# Patient Record
Sex: MALE | Race: BLACK OR AFRICAN AMERICAN | Employment: FULL TIME | URBAN - METROPOLITAN AREA
[De-identification: names, ages, dates, MRNs, and addresses within clinical notes are randomized per-mention and may not be internally consistent; named-entity substitution may affect disease eponyms.]

---

## 2019-04-17 ENCOUNTER — APPOINTMENT (OUTPATIENT)
Dept: GENERAL RADIOLOGY | Age: 24
End: 2019-04-17
Attending: EMERGENCY MEDICINE
Payer: SELF-PAY

## 2019-04-17 ENCOUNTER — HOSPITAL ENCOUNTER (EMERGENCY)
Age: 24
Discharge: HOME | End: 2019-04-17
Attending: EMERGENCY MEDICINE | Admitting: EMERGENCY MEDICINE
Payer: SELF-PAY

## 2019-04-17 VITALS
HEART RATE: 62 BPM | TEMPERATURE: 99.1 F | DIASTOLIC BLOOD PRESSURE: 95 MMHG | HEIGHT: 69 IN | BODY MASS INDEX: 25.8 KG/M2 | WEIGHT: 174.16 LBS | RESPIRATION RATE: 16 BRPM | SYSTOLIC BLOOD PRESSURE: 139 MMHG

## 2019-04-17 DIAGNOSIS — M79.642 LEFT HAND PAIN: Primary | ICD-10-CM

## 2019-04-17 DIAGNOSIS — T14.90XA BLUNT INJURY: ICD-10-CM

## 2019-04-17 PROCEDURE — 73130 X-RAY EXAM OF HAND: CPT

## 2019-04-17 PROCEDURE — 74011250637 HC RX REV CODE- 250/637: Performed by: EMERGENCY MEDICINE

## 2019-04-17 PROCEDURE — 99282 EMERGENCY DEPT VISIT SF MDM: CPT

## 2019-04-17 RX ORDER — IBUPROFEN 400 MG/1
800 TABLET ORAL
Status: COMPLETED | OUTPATIENT
Start: 2019-04-17 | End: 2019-04-17

## 2019-04-17 RX ORDER — IBUPROFEN 800 MG/1
800 TABLET ORAL
Qty: 20 TAB | Refills: 0 | Status: SHIPPED | OUTPATIENT
Start: 2019-04-17 | End: 2019-04-24

## 2019-04-17 RX ORDER — ACETAMINOPHEN 500 MG
1000 TABLET ORAL ONCE
Status: COMPLETED | OUTPATIENT
Start: 2019-04-17 | End: 2019-04-17

## 2019-04-17 RX ADMIN — ACETAMINOPHEN 1000 MG: 500 TABLET ORAL at 15:57

## 2019-04-17 RX ADMIN — IBUPROFEN 800 MG: 400 TABLET, FILM COATED ORAL at 15:56

## 2019-04-17 NOTE — LETTER
[facility] EMERGENCY DEPT 
[address] [place] [address] [address] [phone] 
[phone] Work/School Note Date: 4/17/2019 To Whom It May concern: 
 
Juhi David was seen and treated today in the emergency room by the following provider(s): 
Attending Provider: Yuly Morelos DO Physician Assistant: WALLY Reynolds. Juhi David may return to work on [address] [place] [address]  Sincerely, WALLY Tinoco

## 2019-04-18 NOTE — ED PROVIDER NOTES
EMERGENCY DEPARTMENT HISTORY AND PHYSICAL EXAM 
 
 
Date: 4/17/2019 Patient Name: Gutierrez Dudley History of Presenting Illness Chief Complaint Patient presents with  
 Hand Pain L hand pain and swelling, reports that he was at work when a pole jammed into his L hand History Provided By: Patient HPI: Gutierrez Dudley, 21 y.o. male presents ambulatory to the ED with cc of several hours of 8 out of 10 constant, aching left hand pain that is worse with palpation and movement and started suddenly at work when a heavy steel pole jammed into his left hand. He is left-hand dominant. There are no other complaints, changes, or physical findings at this time. PCP: Zhanna Blackman MD 
 
Current Outpatient Medications Medication Sig Dispense Refill  ibuprofen (MOTRIN) 800 mg tablet Take 1 Tab by mouth every eight (8) hours as needed for Pain for up to 7 days. 20 Tab 0  
 Epinephrine 0.15 mg/0.15 mL PnIj by IntraMUSCular route.  phenylephrine-chlorpheniramine-hydrocodone (HISTINEX HC) 2-5-2.5 mg/5 mL Syrp Take 10 mL by mouth four (4) times daily as needed for Cough. 120 mL 1 Past History Past Medical History: No past medical history on file. Past Surgical History: No past surgical history on file. Family History: No family history on file. Social History: 
Social History Tobacco Use  Smoking status: Never Smoker  Smokeless tobacco: Never Used Substance Use Topics  Alcohol use: Not on file  Drug use: Not on file Allergies: Allergies Allergen Reactions  Pollen Extracts Itching Review of Systems Review of Systems Constitutional: Negative for fatigue and fever. HENT: Negative for congestion, ear pain and rhinorrhea. Eyes: Negative for pain and redness. Respiratory: Negative for cough and wheezing. Cardiovascular: Negative for chest pain and palpitations. Gastrointestinal: Negative for abdominal pain, nausea and vomiting. Genitourinary: Negative for dysuria, frequency and urgency. Musculoskeletal: Negative for back pain, neck pain and neck stiffness. Left hand pain Skin: Negative for rash and wound. Neurological: Negative for weakness, light-headedness, numbness and headaches. Physical Exam  
Physical Exam  
Constitutional: He is oriented to person, place, and time. He appears well-developed and well-nourished. Non-toxic appearance. No distress. HENT:  
Head: Normocephalic and atraumatic. Head is without right periorbital erythema and without left periorbital erythema. Right Ear: External ear normal.  
Left Ear: External ear normal.  
Nose: Nose normal.  
Mouth/Throat: Uvula is midline. No trismus in the jaw. Eyes: Pupils are equal, round, and reactive to light. Conjunctivae and EOM are normal. No scleral icterus. Neck: Normal range of motion and full passive range of motion without pain. Cardiovascular: Normal rate, regular rhythm and normal heart sounds. Pulmonary/Chest: Effort normal and breath sounds normal. No accessory muscle usage. No tachypnea. No respiratory distress. He has no decreased breath sounds. He has no wheezes. Abdominal: Soft. There is no tenderness. There is no rigidity and no guarding. Musculoskeletal: Normal range of motion. Left hand: He exhibits tenderness. He exhibits normal range of motion, no bony tenderness, no deformity and no laceration. Normal sensation noted. Hands: 
Neurological: He is alert and oriented to person, place, and time. He is not disoriented. No cranial nerve deficit or sensory deficit. GCS eye subscore is 4. GCS verbal subscore is 5. GCS motor subscore is 6. Skin: Skin is intact. No rash noted. Psychiatric: He has a normal mood and affect. His speech is normal.  
Nursing note and vitals reviewed. Diagnostic Study Results Labs - 
 No results found for this or any previous visit (from the past 12 hour(s)). Radiologic Studies -  
XR HAND LT MIN 3 V Final Result IMPRESSION: No acute abnormality. CT Results  (Last 48 hours) None CXR Results  (Last 48 hours) None Medical Decision Making I am the first provider for this patient. I reviewed the vital signs, available nursing notes, past medical history, past surgical history, family history and social history. Vital Signs-Reviewed the patient's vital signs. Patient Vitals for the past 12 hrs: 
 Temp Pulse Resp BP  
04/17/19 1548 99.1 °F (37.3 °C) 62 16 (!) 139/95 Records Reviewed: Nursing Notes and Old Medical Records Provider Notes (Medical Decision Making): DDx: Fracture, sprain, strain, contusion ED Course:  
Initial assessment performed. The patients presenting problems have been discussed, and they are in agreement with the care plan formulated and outlined with them. I have encouraged them to ask questions as they arise throughout their visit. Disposition: 
Discharge PLAN: 
1. Discharge Medication List as of 4/17/2019  6:19 PM  
  
START taking these medications Details  
ibuprofen (MOTRIN) 800 mg tablet Take 1 Tab by mouth every eight (8) hours as needed for Pain for up to 7 days. , Print, Disp-20 Tab, R-0  
  
  
CONTINUE these medications which have NOT CHANGED Details Epinephrine 0.15 mg/0.15 mL PnIj by IntraMUSCular route. Historical Med  
  
phenylephrine-chlorpheniramine-hydrocodone (HISTINEX HC) 2-5-2.5 mg/5 mL Syrp Take 10 mL by mouth four (4) times daily as needed for Cough. Print, 10 mL, Disp-120 mL, R-1  
  
  
 
2. Follow-up Information Follow up With Specialties Details Why Contact Info Shoaib Singletary MD Hand Surgery Schedule an appointment as soon as possible for a visit ORTHO / HAND: as needed if symptoms persist [address] 
[phone] Return to ED if worse Diagnosis Clinical Impression: 1. Left hand pain 2. Blunt injury

## 2019-07-15 ENCOUNTER — TELEPHONE (OUTPATIENT)
Dept: FAMILY MEDICINE CLINIC | Age: 24
End: 2019-07-15

## 2019-07-15 ENCOUNTER — OFFICE VISIT (OUTPATIENT)
Dept: URGENT CARE | Age: 24
End: 2019-07-15

## 2019-07-15 VITALS
TEMPERATURE: 98.7 F | RESPIRATION RATE: 18 BRPM | BODY MASS INDEX: 24.59 KG/M2 | HEIGHT: 69 IN | WEIGHT: 166 LBS | DIASTOLIC BLOOD PRESSURE: 62 MMHG | OXYGEN SATURATION: 98 % | HEART RATE: 56 BPM | SYSTOLIC BLOOD PRESSURE: 127 MMHG

## 2019-07-15 DIAGNOSIS — J98.01 COUGH DUE TO BRONCHOSPASM: Primary | ICD-10-CM

## 2019-07-15 RX ORDER — ALBUTEROL SULFATE 90 UG/1
2 AEROSOL, METERED RESPIRATORY (INHALATION)
Qty: 1 INHALER | Refills: 0 | Status: SHIPPED | OUTPATIENT
Start: 2019-07-15

## 2019-07-15 RX ORDER — BENZONATATE 200 MG/1
200 CAPSULE ORAL
Qty: 21 CAP | Refills: 0 | Status: SHIPPED | OUTPATIENT
Start: 2019-07-15 | End: 2019-07-22

## 2019-07-15 RX ORDER — PREDNISONE 10 MG/1
TABLET ORAL
Qty: 21 TAB | Refills: 0 | Status: SHIPPED | OUTPATIENT
Start: 2019-07-15

## 2019-07-15 NOTE — TELEPHONE ENCOUNTER
Mother calling to get appt for patient. He is severely congested, coughing up brown mucus. He has not been here since 2011, made her aware he would be considered a new patient because it has been over 3 years.  Mrs Ayah Costello can be reached at [phone]

## 2019-07-15 NOTE — PATIENT INSTRUCTIONS
Reactive Airway Disease: Care Instructions  Your Care Instructions    Reactive airway disease is a breathing problem that appears as wheezing, a whistling noise in your airways. It may be caused by a viral or bacterial infection, allergies, tobacco smoke, or something else in the environment. When you are around these triggers, your body releases chemicals that make the airways get tight. Reactive airway disease is a lot like asthma. Both can cause wheezing. But asthma is ongoing, while reactive airway disease may occur only now and then. Tests can be done to tell whether you have asthma. You may take the same medicines used to treat asthma. Good home care and follow-up care with your doctor can help you recover. Follow-up care is a key part of your treatment and safety. Be sure to make and go to all appointments, and call your doctor if you are having problems. It's also a good idea to know your test results and keep a list of the medicines you take. How can you care for yourself at home? · Take your medicines exactly as prescribed. Call your doctor if you think you are having a problem with your medicine. · Do not smoke or allow others to smoke around you. If you need help quitting, talk to your doctor about stop-smoking programs and medicines. These can increase your chances of quitting for good. · If you know what caused your wheezing (such as perfume or the odor of household chemicals), try to avoid it in the future. · Wash your hands several times a day, and consider using hand gels or wipes that contain alcohol. This can prevent colds and other infections. When should you call for help? Call 911 anytime you think you may need emergency care. For example, call if:    · You have severe trouble breathing.    Watch closely for changes in your health, and be sure to contact your doctor if:    · You cough up yellow, dark brown, or bloody mucus.     · You have a fever.     · Your wheezing gets worse. Where can you learn more? Go to http://rex-linda.info/. Enter S757 in the search box to learn more about \"Reactive Airway Disease: Care Instructions. \"  Current as of: September 5, 2018  Content Version: 11.9  © 7817-2539 [employer]. Care instructions adapted under license by [employer] (which disclaims liability or warranty for this information). If you have questions about a medical condition or this instruction, always ask your healthcare professional. [place] [address] any warranty or liability for your use of this information.

## 2019-07-15 NOTE — PROGRESS NOTES
Cough   The history is provided by the patient. This is a new problem. The current episode started more than 2 days ago. The problem occurs every few minutes. The problem has not changed since onset. The cough is non-productive. There has been no fever. Associated symptoms include rhinorrhea, sore throat, shortness of breath and wheezing. Pertinent negatives include no chest pain and no chills. He has tried nothing for the symptoms. Risk factors: exposed to MOLD. He is not a smoker. His past medical history is significant for asthma (in childhood- h/o RAD). History reviewed. No pertinent past medical history. History reviewed. No pertinent surgical history. History reviewed. No pertinent family history.      Social History     Socioeconomic History    Marital status: SINGLE     Spouse name: Not on file    Number of children: Not on file    Years of education: Not on file    Highest education level: Not on file   Occupational History    Not on file   Social Needs    Financial resource strain: Not on file    Food insecurity:     Worry: Not on file     Inability: Not on file    Transportation needs:     Medical: Not on file     Non-medical: Not on file   Tobacco Use    Smoking status: Never Smoker    Smokeless tobacco: Never Used   Substance and Sexual Activity    Alcohol use: Not on file    Drug use: Not on file    Sexual activity: Not on file   Lifestyle    Physical activity:     Days per week: Not on file     Minutes per session: Not on file    Stress: Not on file   Relationships    Social connections:     Talks on phone: Not on file     Gets together: Not on file     Attends Confucianism service: Not on file     Active member of club or organization: Not on file     Attends meetings of clubs or organizations: Not on file     Relationship status: Not on file    Intimate partner violence:     Fear of current or ex partner: Not on file     Emotionally abused: Not on file     Physically abused: Not on file     Forced sexual activity: Not on file   Other Topics Concern    Not on file   Social History Narrative    Not on file                ALLERGIES: Pollen extracts    Review of Systems   Constitutional: Negative for chills. HENT: Positive for rhinorrhea and sore throat. Respiratory: Positive for cough, shortness of breath and wheezing. Cardiovascular: Negative for chest pain. All other systems reviewed and are negative. Vitals:    07/15/19 1847   BP: 127/62   Pulse: (!) 56   Resp: 18   Temp: 98.7 °F (37.1 °C)   SpO2: 98%   Weight: 166 lb (75.3 kg)   Height: 5' 9\" (1.753 m)       Physical Exam   Constitutional: No distress. HENT:   Right Ear: Tympanic membrane and ear canal normal.   Left Ear: Tympanic membrane and ear canal normal.   Nose: Nose normal.   Mouth/Throat: No oropharyngeal exudate, posterior oropharyngeal edema or posterior oropharyngeal erythema. Eyes: Conjunctivae are normal. Right eye exhibits no discharge. Left eye exhibits no discharge. Neck: Neck supple. Pulmonary/Chest: Effort normal. No respiratory distress. He has decreased breath sounds. He has no wheezes. He has rhonchi. He has no rales. Lymphadenopathy:     He has no cervical adenopathy. Skin: No rash noted. Nursing note and vitals reviewed. MDM    Procedures      ICD-10-CM ICD-9-CM    1. Cough due to bronchospasm J98.01 519.11     h/o RAD     Medications Ordered Today   Medications    albuterol (PROVENTIL HFA, VENTOLIN HFA, PROAIR HFA) 90 mcg/actuation inhaler     Sig: Take 2 Puffs by inhalation every six (6) hours as needed for Wheezing. Dispense:  1 Inhaler     Refill:  0    benzonatate (TESSALON) 200 mg capsule     Sig: Take 1 Cap by mouth three (3) times daily as needed for Cough for up to 7 days.      Dispense:  21 Cap     Refill:  0    predniSONE (STERAPRED DS) 10 mg dose pack     Sig: As directed     Dispense:  21 Tab     Refill:  0     No results found for any visits on 07/15/19. The patients condition was discussed with the patient and they understand. The patient is to follow up with primary care doctor. If signs and symptoms become worse the pt is to go to the ER. The patient is to take medications as prescribed.